# Patient Record
Sex: FEMALE | Race: BLACK OR AFRICAN AMERICAN | NOT HISPANIC OR LATINO
[De-identification: names, ages, dates, MRNs, and addresses within clinical notes are randomized per-mention and may not be internally consistent; named-entity substitution may affect disease eponyms.]

---

## 2019-09-09 ENCOUNTER — TRANSCRIPTION ENCOUNTER (OUTPATIENT)
Age: 59
End: 2019-09-09

## 2020-12-16 ENCOUNTER — APPOINTMENT (OUTPATIENT)
Dept: PULMONOLOGY | Facility: CLINIC | Age: 60
End: 2020-12-16
Payer: COMMERCIAL

## 2020-12-16 VITALS
HEART RATE: 100 BPM | SYSTOLIC BLOOD PRESSURE: 130 MMHG | OXYGEN SATURATION: 98 % | HEIGHT: 62 IN | TEMPERATURE: 97.2 F | WEIGHT: 164 LBS | BODY MASS INDEX: 30.18 KG/M2 | DIASTOLIC BLOOD PRESSURE: 80 MMHG

## 2020-12-16 DIAGNOSIS — Z12.2 ENCOUNTER FOR SCREENING FOR MALIGNANT NEOPLASM OF RESPIRATORY ORGANS: ICD-10-CM

## 2020-12-16 PROCEDURE — 95012 NITRIC OXIDE EXP GAS DETER: CPT | Mod: GC

## 2020-12-16 PROCEDURE — 94727 GAS DIL/WSHOT DETER LNG VOL: CPT | Mod: GC

## 2020-12-16 PROCEDURE — ZZZZZ: CPT

## 2020-12-16 PROCEDURE — 94060 EVALUATION OF WHEEZING: CPT | Mod: GC

## 2020-12-16 PROCEDURE — 94729 DIFFUSING CAPACITY: CPT | Mod: GC

## 2020-12-16 PROCEDURE — 99204 OFFICE O/P NEW MOD 45 MIN: CPT | Mod: 25,GC

## 2020-12-16 PROCEDURE — 99072 ADDL SUPL MATRL&STAF TM PHE: CPT

## 2020-12-16 NOTE — PHYSICAL EXAM
[No Acute Distress] : no acute distress [Normal Oropharynx] : normal oropharynx [II] : Mallampati Class: II [Normal Appearance] : normal appearance [No Neck Mass] : no neck mass [Normal Rate/Rhythm] : normal rate/rhythm [Normal S1, S2] : normal s1, s2 [No Murmurs] : no murmurs [No Resp Distress] : no resp distress [Wheeze] : wheeze [No Abnormalities] : no abnormalities [Benign] : benign [Normal Gait] : normal gait [No Clubbing] : no clubbing [No Cyanosis] : no cyanosis [No Edema] : no edema [FROM] : FROM [Normal Color/ Pigmentation] : normal color/ pigmentation [No Focal Deficits] : no focal deficits [Oriented x3] : oriented x3 [Normal Affect] : normal affect

## 2020-12-17 NOTE — ASSESSMENT
[FreeTextEntry1] : Data reviewed:\par \par PA/lat CXR in office 12/16/2020 :\par \par Yony 12/16/2020 : mild obstruction / FENO 5\par PFT 12/16/20: mild fixed obstruction (had albuterol before test), TLC 94%, DLCO 67%\par \par Impression:\par Hx of Asthma \par Smoker\par \par Plan:\par hx of childhood  onset asthma but also has > 40 pck yr hx of smoking. Symptoms not well controlled on current regimen of ICS. PFT shoes moderate obstruction with no significant bronchodilation. No other hx suggestive of allergic asthma,  Eosinophil normal ( patient portal 11/2020). \par - Probably ACOS, would escalate to ICS and LABA ( symbicort). \par - Albuterol as needed. \par - counseled for smoking cessation. \par - Ref to Lung cancer screening. \par - flu shot received at work. \par \par --\par Attending Addendum\par \par Seen and examined by me and agree w above. Active smoker w hx asthma, COPD on PFT, w daily symptoms despite Qvar. Step up to Symbicort, quit smoking w patch (has used successfully before), enter LDCT.\par

## 2020-12-17 NOTE — CONSULT LETTER
[Dear  ___] : Dear  [unfilled], [( Thank you for referring [unfilled] for consultation for _____ )] : Thank you for referring [unfilled] for consultation for [unfilled] [Please see my note below.] : Please see my note below. [Consult Closing:] : Thank you very much for allowing me to participate in the care of this patient.  If you have any questions, please do not hesitate to contact me. [Sincerely,] : Sincerely, [FreeTextEntry2] : Adiel Manley, DO\par [address] \[place] [address]\par  [FreeTextEntry3] : Madhuri Mcclelland MD, FCCP\par

## 2020-12-17 NOTE — REVIEW OF SYSTEMS
[Cough] : cough [Wheezing] : wheezing [Negative] : HEENT [Frequent URIs] : no frequent URIs [Sputum] : no sputum [Dyspnea] : no dyspnea [SOB on Exertion] : no sob on exertion [Chest Discomfort] : no chest discomfort [Edema] : no edema [Orthopnea] : no orthopnea [Palpitations] : no palpitations [Hay Fever] : no hay fever [Nasal Discharge] : no nasal discharge [GERD] : no gerd [Vomiting] : no vomiting [Diarrhea] : no diarrhea [Nocturia] : no nocturia [Arthralgias] : no arthralgias [Raynaud] : no raynaud [Anemia] : no anemia [Headache] : no headache [Focal Weakness] : no focal weakness [Seizures] : no seizures [Dizziness] : no dizziness [Depression] : no depression [Anxiety] : no anxiety [Diabetes] : no diabetes

## 2020-12-17 NOTE — HISTORY OF PRESENT ILLNESS
[Current] : current [>= 30 pack years] : >= 30 pack years [Never] : never [TextBox_4] : 12/16/2020 [Morgan]: Asked to evaluate patient by Dr Manley for asthma, \par She was dx with asthma in 1983, Admitted  2 times since then, Never intubated, has been on Symbicort but was that was stopped and started on Qvar ( ICS) 2 months ago. She is also on Singulair , uses albuterol 2 /day, and portable nebulizer 1-2 times a day. Symptomatic with SOB , wheezing and dry cough when having a bad day. Urgent care / ER visits for asthma: 3-4 times /yr. No allergies. Smoker > 40 pck yrs, she has a dog and a cat but does not think that she is allergic to them. 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 \par \par \par \par \par \par \par

## 2020-12-21 VITALS — WEIGHT: 164 LBS | HEIGHT: 62 IN | BODY MASS INDEX: 30.18 KG/M2

## 2020-12-21 PROBLEM — Z80.1 FAMILY HISTORY OF LUNG CANCER: Status: ACTIVE | Noted: 2020-12-21

## 2020-12-21 PROBLEM — F17.210 CIGARETTE SMOKER: Status: ACTIVE | Noted: 2020-12-18

## 2020-12-21 NOTE — REASON FOR VISIT
[Home] : at home, [unfilled] , at the time of the visit. [Medical Office: ([facility])___] : at the medical office located in  [Verbal consent obtained from patient] : the patient, [unfilled] [Annual Follow-Up] : an annual follow-up visit [Review of Eligibility] : review of eligibility [Low-Dose CT Screening Discussion] : low-dose CT lung cancer screening discussion

## 2020-12-21 NOTE — ASSESSMENT
[Discussed Risks and Advised to Quit Smoking] : Discussed risks and advised to quit smoking [Discussed Cessation Medication] : cessation medication was discussed [Discussed Cessation Strategies] : cessation strategies were discussed [Ready] : Patient is ready for cessation intervention [Contemplation] : Contemplation: The patient is considering quitting smoking

## 2020-12-22 ENCOUNTER — APPOINTMENT (OUTPATIENT)
Dept: PULMONOLOGY | Facility: CLINIC | Age: 60
End: 2020-12-22
Payer: COMMERCIAL

## 2020-12-22 DIAGNOSIS — Z80.1 FAMILY HISTORY OF MALIGNANT NEOPLASM OF TRACHEA, BRONCHUS AND LUNG: ICD-10-CM

## 2020-12-22 DIAGNOSIS — F17.210 NICOTINE DEPENDENCE, CIGARETTES, UNCOMPLICATED: ICD-10-CM

## 2020-12-22 PROCEDURE — G0296 VISIT TO DETERM LDCT ELIG: CPT

## 2020-12-22 PROCEDURE — 99072 ADDL SUPL MATRL&STAF TM PHE: CPT

## 2020-12-22 NOTE — PLAN
[Smoking Cessation Guidance Provided] : Smoking cessation guidance was provided to patient [[facility]] : referred to [facility] [phone] [Smoking Cessation] : smoking cessation [FreeTextEntry1] : Plan:\par -Low Dose CT chest for lung cancer screening\par -Follow up with patient and her referring provider after her LDCT results have been reviewed by the multi-disciplinary clinical team\par -Encouraged smoking cessation\par -Referral to CTC\par \par \par Engaged in shared decision making with Ms. OTONIEL FANG . Answered all questions. She verbalized understanding and agreement. She knows to call back with any questions or concerns

## 2020-12-22 NOTE — HISTORY OF PRESENT ILLNESS
[Current] : current smoker [_____ pack-years] : [unfilled] pack-years [TextBox_13] : Referred by Dr. Mcclelland\par \par Ms. OTONIEL FANG  is a 60 year old woman with a history of asthma, possible ACOS\par \par She  was seen in the office by Dr. Mcclelland for review of eligibility for, as well as, discussion of Low-Dose CT lung cancer screening program. Over the telephone today we reviewed and confirmed that the patient meets screening eligibility criteria:\par -Age: 60 year \par Smoking status:\par -Current smoker\par -Number of pack(s) per day: 3/4\par -Number of year smoked: 42\par -Number of pack years smokin\par \par Quit early this year but stress of pandemic made her relapse.\par \par \par Ms. FANG denies any signs or symptoms of lung cancer including new cough, change in cough, hemoptysis and unintentional weight loss. \par \par Ms. FANG denies any personal history of lung cancer. No lung cancer in a 1st degree relative. An aunt had lung cancer.  History of lung disease: asthma. 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

## 2021-01-05 ENCOUNTER — RESULT REVIEW (OUTPATIENT)
Age: 61
End: 2021-01-05

## 2021-01-05 ENCOUNTER — APPOINTMENT (OUTPATIENT)
Dept: CT IMAGING | Facility: HOSPITAL | Age: 61
End: 2021-01-05
Payer: COMMERCIAL

## 2021-01-05 ENCOUNTER — OUTPATIENT (OUTPATIENT)
Dept: OUTPATIENT SERVICES | Facility: HOSPITAL | Age: 61
LOS: 1 days | End: 2021-01-05
Payer: COMMERCIAL

## 2021-01-05 PROCEDURE — 71250 CT THORAX DX C-: CPT

## 2021-01-05 PROCEDURE — 71250 CT THORAX DX C-: CPT | Mod: 26

## 2021-01-13 ENCOUNTER — APPOINTMENT (OUTPATIENT)
Dept: PULMONOLOGY | Facility: CLINIC | Age: 61
End: 2021-01-13
Payer: COMMERCIAL

## 2021-01-13 VITALS
BODY MASS INDEX: 31.28 KG/M2 | TEMPERATURE: 98.1 F | SYSTOLIC BLOOD PRESSURE: 158 MMHG | WEIGHT: 170 LBS | HEIGHT: 62 IN | HEART RATE: 105 BPM | DIASTOLIC BLOOD PRESSURE: 93 MMHG | OXYGEN SATURATION: 98 % | RESPIRATION RATE: 12 BRPM

## 2021-01-13 DIAGNOSIS — F32.9 MAJOR DEPRESSIVE DISORDER, SINGLE EPISODE, UNSPECIFIED: ICD-10-CM

## 2021-01-13 DIAGNOSIS — J45.40 MODERATE PERSISTENT ASTHMA, UNCOMPLICATED: ICD-10-CM

## 2021-01-13 DIAGNOSIS — F17.200 NICOTINE DEPENDENCE, UNSPECIFIED, UNCOMPLICATED: ICD-10-CM

## 2021-01-13 DIAGNOSIS — I10 ESSENTIAL (PRIMARY) HYPERTENSION: ICD-10-CM

## 2021-01-13 DIAGNOSIS — F51.04 PSYCHOPHYSIOLOGIC INSOMNIA: ICD-10-CM

## 2021-01-13 PROCEDURE — 99072 ADDL SUPL MATRL&STAF TM PHE: CPT

## 2021-01-13 PROCEDURE — 99204 OFFICE O/P NEW MOD 45 MIN: CPT

## 2021-01-13 NOTE — HISTORY OF PRESENT ILLNESS
[FreeTextEntry1] : 01/13/2021 :  OTONIEL FANG is a 60 year current smoker( 1/2PPD x 30 years, part of our lung CA screening) female with PMHx depression, well controlled asthma, HTN, who is here for acute insomnia. \par \par She has been experiencing insomnia for the past 5 months. She was taking prozac and Ambien but  her psychiatrist does not accept her insurance. She was feeling well with Ambien and was sleeping about 5 hours/night which is what she feels she needs in order to feel rested. \par She feels sleepy during the day. She sometimes dozes off at work specially during virtual meetings.\par   \par \par Sleep Routine:\par \par She goes to bed at 11, sleep latency is about 1 hour,she wakes up twice, WASO is about 30-40 min, and then she is up at 5A. She naps about 1 hour almost everyday . Boykins sleepiness scale (out of 24 points) = 7\par \par  Her PFT in Dec 2020  showed: Mod obstruction, no post bronchodilator changes. lung volume wnl. mild decrease diffusion capacity.\par \par \par She denies snoring, witness apnea, cataplexy, RLS, parasomnia, or any other sleep behavioral issues\par

## 2021-01-13 NOTE — REVIEW OF SYSTEMS
[EDS: ESS=____] : daytime somnolence: ESS=[unfilled] [Fatigue] : fatigue [Obesity] : obesity [Nocturia] : nocturia [Depression] : depression [Negative] : Musculoskeletal [FreeTextEntry9] : HTN

## 2021-01-13 NOTE — ASSESSMENT
[FreeTextEntry1] : 59 y/o F with acute insomnia who is here for initial visit.  She seems to me to be sleepier than is normal for simple psychophysiologic insomnia.  Treated for depression in past, but does not feel (or seem to me) depressed now.  Will r/o obstructive sleep apnea with unattended home sleep testing before going further.

## 2021-01-13 NOTE — PHYSICAL EXAM
[Normal Appearance] : normal appearance [General Appearance - In No Acute Distress] : no acute distress [Normal Conjunctiva] : the conjunctiva exhibited no abnormalities [Normal Oropharynx] : normal oropharynx [III] : III [Neck Appearance] : the appearance of the neck was normal [Neck Cervical Mass (___cm)] : no neck mass was observed [Jugular Venous Distention Increased] : there was no jugular-venous distention [Thyroid Diffuse Enlargement] : the thyroid was not enlarged [Thyroid Nodule] : there were no palpable thyroid nodules [Apical Impulse] : the apical impulse was normal [Heart Rate And Rhythm] : heart rate was normal and rhythm regular [Heart Sounds] : normal S1 and S2 [Heart Sounds Gallop] : no gallops [Murmurs] : no murmurs [Heart Sounds Pericardial Friction Rub] : no pericardial rub [] : no respiratory distress [Respiration, Rhythm And Depth] : normal respiratory rhythm and effort [Exaggerated Use Of Accessory Muscles For Inspiration] : no accessory muscle use [Auscultation Breath Sounds / Voice Sounds] : lungs were clear to auscultation bilaterally [Chest Palpation] : palpation of the chest revealed no abnormalities [Lungs Percussion] : the lungs were normal to percussion [Abnormal Walk] : normal gait [Involuntary Movements] : no involuntary movements were seen [Nail Clubbing] : no clubbing of the fingernails [Skin Color & Pigmentation] : normal skin color and pigmentation [Skin Turgor] : normal skin turgor [No Focal Deficits] : no focal deficits [Oriented To Time, Place, And Person] : oriented to person, place, and time [Affect] : the affect was normal [Mood] : the mood was normal

## 2021-02-05 ENCOUNTER — APPOINTMENT (OUTPATIENT)
Dept: SLEEP CENTER | Facility: HOME HEALTH | Age: 61
End: 2021-02-05
Payer: COMMERCIAL

## 2021-02-05 ENCOUNTER — OUTPATIENT (OUTPATIENT)
Dept: OUTPATIENT SERVICES | Facility: HOSPITAL | Age: 61
LOS: 1 days | End: 2021-02-05
Payer: COMMERCIAL

## 2021-02-05 PROCEDURE — 95800 SLP STDY UNATTENDED: CPT | Mod: 26

## 2021-02-05 PROCEDURE — 95800 SLP STDY UNATTENDED: CPT

## 2021-02-08 DIAGNOSIS — G47.33 OBSTRUCTIVE SLEEP APNEA (ADULT) (PEDIATRIC): ICD-10-CM

## 2021-02-09 ENCOUNTER — TRANSCRIPTION ENCOUNTER (OUTPATIENT)
Age: 61
End: 2021-02-09

## 2021-02-11 ENCOUNTER — APPOINTMENT (OUTPATIENT)
Dept: PULMONOLOGY | Facility: CLINIC | Age: 61
End: 2021-02-11
Payer: COMMERCIAL

## 2021-02-11 VITALS
WEIGHT: 167 LBS | SYSTOLIC BLOOD PRESSURE: 142 MMHG | BODY MASS INDEX: 30.73 KG/M2 | TEMPERATURE: 97.3 F | DIASTOLIC BLOOD PRESSURE: 84 MMHG | OXYGEN SATURATION: 97 % | HEART RATE: 86 BPM | HEIGHT: 62 IN

## 2021-02-11 DIAGNOSIS — G47.00 INSOMNIA, UNSPECIFIED: ICD-10-CM

## 2021-02-11 PROCEDURE — 99072 ADDL SUPL MATRL&STAF TM PHE: CPT

## 2021-02-11 PROCEDURE — 99213 OFFICE O/P EST LOW 20 MIN: CPT

## 2021-02-11 NOTE — PHYSICAL EXAM
[Well Groomed] : well groomed [General Appearance - In No Acute Distress] : no acute distress [Normal Conjunctiva] : the conjunctiva exhibited no abnormalities [Normal Oropharynx] : normal oropharynx [Neck Appearance] : the appearance of the neck was normal [Neck Cervical Mass (___cm)] : no neck mass was observed [Jugular Venous Distention Increased] : there was no jugular-venous distention [Thyroid Diffuse Enlargement] : the thyroid was not enlarged [Thyroid Nodule] : there were no palpable thyroid nodules [Heart Rate And Rhythm] : heart rate was normal and rhythm regular [Heart Sounds] : normal S1 and S2 [Heart Sounds Gallop] : no gallops [Murmurs] : no murmurs [Heart Sounds Pericardial Friction Rub] : no pericardial rub [] : no respiratory distress [Auscultation Breath Sounds / Voice Sounds] : lungs were clear to auscultation bilaterally [Involuntary Movements] : no involuntary movements were seen [Abnormal Walk] : normal gait [Nail Clubbing] : no clubbing of the fingernails [Cyanosis, Localized] : no localized cyanosis [No Focal Deficits] : no focal deficits [Skin Color & Pigmentation] : normal skin color and pigmentation [Oriented To Time, Place, And Person] : oriented to person, place, and time [Impaired Insight] : insight and judgment were intact [Affect] : the affect was normal [Mood] : the mood was normal

## 2021-02-11 NOTE — PHYSICAL EXAM
[Well Groomed] : well groomed [General Appearance - In No Acute Distress] : no acute distress [Normal Conjunctiva] : the conjunctiva exhibited no abnormalities [Normal Oropharynx] : normal oropharynx [Neck Appearance] : the appearance of the neck was normal [Neck Cervical Mass (___cm)] : no neck mass was observed [Thyroid Diffuse Enlargement] : the thyroid was not enlarged [Jugular Venous Distention Increased] : there was no jugular-venous distention [Thyroid Nodule] : there were no palpable thyroid nodules [Heart Rate And Rhythm] : heart rate was normal and rhythm regular [Heart Sounds] : normal S1 and S2 [Heart Sounds Gallop] : no gallops [Murmurs] : no murmurs [Heart Sounds Pericardial Friction Rub] : no pericardial rub [] : no respiratory distress [Auscultation Breath Sounds / Voice Sounds] : lungs were clear to auscultation bilaterally [Abnormal Walk] : normal gait [Involuntary Movements] : no involuntary movements were seen [Nail Clubbing] : no clubbing of the fingernails [Cyanosis, Localized] : no localized cyanosis [No Focal Deficits] : no focal deficits [Skin Color & Pigmentation] : normal skin color and pigmentation [Oriented To Time, Place, And Person] : oriented to person, place, and time [Impaired Insight] : insight and judgment were intact [Affect] : the affect was normal [Mood] : the mood was normal

## 2021-02-18 ENCOUNTER — APPOINTMENT (OUTPATIENT)
Dept: PULMONOLOGY | Facility: CLINIC | Age: 61
End: 2021-02-18

## 2021-02-22 ENCOUNTER — APPOINTMENT (OUTPATIENT)
Dept: PULMONOLOGY | Facility: CLINIC | Age: 61
End: 2021-02-22
Payer: COMMERCIAL

## 2021-02-22 PROCEDURE — 99214 OFFICE O/P EST MOD 30 MIN: CPT | Mod: 95

## 2021-02-22 NOTE — CONSULT LETTER
[Dear  ___] : Dear  [unfilled], [Courtesy Letter:] : I had the pleasure of seeing your patient, [unfilled], in my office today. [Please see my note below.] : Please see my note below. [Consult Closing:] : Thank you very much for allowing me to participate in the care of this patient.  If you have any questions, please do not hesitate to contact me. [Sincerely,] : Sincerely, [FreeTextEntry2] : Adiel Manley, DO\par [address] \[place] [address]\par  [FreeTextEntry3] : Madhuri Mcclelland MD, FCCP\par

## 2021-02-22 NOTE — HISTORY OF PRESENT ILLNESS
[TextBox_4] : 12/16/2020: Seen in clinic w Dr Mora. Active smoker w hx asthma, COPD on PFT, w daily symptoms despite Qvar. Step up to Symbicort, quit smoking w patch (has used successfully before), enter LDCT.\par \par 2/22/21: Telehealth at her request due to Covid pandemic, both in [place]. She wants to follow up on her CT chest, which showed no suspicious nodules but emphysema and coronary calcification. She can only walk 1/2 block without dyspnea. She is having a cardiac cath Thursday. She did feel some improvement in her dyspnea on Symbicort vs Qvar. She had her Covid vaccines at work. Today is her first day attempting smoking cessation. She brought her breakfast and lunch to work so she would not have to go out, which is when she smokes. And she plans to not have beer tomorrow for her birthday because it would make her want to smoke. She is using bupropion and the patch.\par  \par

## 2021-02-22 NOTE — HISTORY OF PRESENT ILLNESS
[FreeTextEntry1] : 02/11/2021 :  OTONIEL FANG is a 60 year female current smoker with PMHx depression, asthma, HTN, and insomnia who is here for follow up after her HST.\par \par Her study shows: mild sleep apnea at 5.2, min below 89% saturation. \par Her psychiatric described Ambien about 2 years ago which stopped taking about 1 year ago. She has been seeinh someone through [facility] but is trying to find another psychiatrist. Otherwise, she denies any new complains or diagnosis since the last visit\par \par

## 2021-02-22 NOTE — ASSESSMENT
[FreeTextEntry1] : Data reviewed:\par \par LDCT [facility] 1/2021 personally reviewed :\par 1. Scattered solid micronodules.\par 2. Scattered linear parenchymal bands of scarring and/or atelectasis.\par 3. Moderate centrilobular emphysema.\par 4. Severe proximal left main coronary artery calcification/stent.\par \par Yony 12/16/2020 : mild obstruction / FENO 5\par PFT 12/16/20: mild fixed obstruction (had albuterol before test), TLC 94%, DLCO 67%\par \par Impression:\par Asthma/COPD overlap\par Tobacco dependence\par Evidence CAD\par \par Plan:\par Continue Symbicort. COPD is mild and would not add LAMA at this time.\par Stay in LDCT, next scan Jan 2022.\par Congratulations on her good work today in smoking cessation and her planning for triggers. Encouraged.\par Agree w cath as planned.

## 2021-02-22 NOTE — HISTORY OF PRESENT ILLNESS
[FreeTextEntry1] : 02/11/2021 :  OTONIEL FANG is a 60 year female current smoker with PMHx depression, asthma, HTN, and insomnia who is here for follow up after her HST.\par \par Her study shows: mild sleep apnea at 5.2, min below 89% saturation. \par Her psychiatric described Ambien about 2 years ago which stopped taking about 1 year ago. She has been seeinh someone through [facility] but is trying to find another psychiatrist. Otherwise, she denies any new complains or diagnosis since the last visit\par \par

## 2021-02-23 VITALS
DIASTOLIC BLOOD PRESSURE: 68 MMHG | RESPIRATION RATE: 16 BRPM | SYSTOLIC BLOOD PRESSURE: 155 MMHG | HEART RATE: 87 BPM | TEMPERATURE: 97 F | OXYGEN SATURATION: 98 %

## 2021-02-23 RX ORDER — CHLORHEXIDINE GLUCONATE 213 G/1000ML
1 SOLUTION TOPICAL ONCE
Refills: 0 | Status: DISCONTINUED | OUTPATIENT
Start: 2021-02-25 | End: 2021-02-25

## 2021-02-23 NOTE — H&P ADULT - ASSESSMENT
62 yo female, recent former smoker (quit one day ago), FHX CAD in mother, PMHx HTN, COPD, recently discovered CAD with LAD via CT scan presented today to [facility] for recommended cardiac cath with possible intervention in light of pt's risk factors, CCS 3 anginal symptoms and LAD disease found incidentally on CT scan.    ASA III and Mallampati III    OF NOTE:     Pt took daily dose of ASA 81 mg PO x1, and was additionally loaded with  mg PO x1 and Plavix 600 mg PO x1 prior to procedure.     Risks & benefits of procedure and alternative therapy have been explained to the patient including but not limited to: allergic reaction, bleeding w/possible need for blood transfusion, infection, renal and vascular compromise, limb damage, arrhythmia, stroke, vessel dissection/perforation, Myocardial infarction, emergent CABG. Informed consent obtained and in chart.   62 yo female, recent former smoker (quit one day ago), FHX CAD in mother, PMHx HTN, COPD, recently discovered CAD with LAD via CT scan presented today to [facility] for recommended cardiac cath with possible intervention in light of pt's risk factors, CCS 3 anginal symptoms and LAD disease found incidentally on CT scan.    ASA III and Mallampati III    OF NOTE:     Pt took daily dose of ASA 81 mg PO x1, and was additionally loaded with  mg PO x1 and Plavix 600 mg PO x1 prior to procedure.     For K of 3.0, Kdur 40 mEq was given as per Dr. Vasquez.     Risks & benefits of procedure and alternative therapy have been explained to the patient including but not limited to: allergic reaction, bleeding w/possible need for blood transfusion, infection, renal and vascular compromise, limb damage, arrhythmia, stroke, vessel dissection/perforation, Myocardial infarction, emergent CABG. Informed consent obtained and in chart.

## 2021-02-23 NOTE — H&P ADULT - HISTORY OF PRESENT ILLNESS
SKELETON    60 yo female, former smoker, FHX CAD in mother, PMHx HTN, COPD, recently discovered CAD with LAD presents to cardiologist, Dr Omer, endorsing shortness of breath with exertion of 1/2 block worsening over the last month. Pt saw pulmonologist Dr Mcclelland and pt underwent low dose CT scan of the chest of 01/05/21 showing no cancer but incidentally showing LAD disease.     In light of pt's risk factors and CCS Class .... anginal symptoms, and LAD disease found on CT scan pt presents for recommended cardiac catheterization on procedure.        Cardiologist: Dr Ramirez ([facility])  COVID PCR: [facility] [address] on 02/23/21, pt needs to call with our fax # so they can fax the information over to us.  Escort: sisterFlory  Pharmacy: [facility] [address]    Verify medications on arrival    60 yo female, recent former smoker (quit one day ago), FHX CAD in mother, PMHx HTN, COPD, recently discovered CAD with LAD via CT scan presents to cardiologist, Dr Ramirez, endorsing shortness of breath with exertion, mild chest pain, fatigue of 1/2 block worsening over the last month. Denies dizziness, diaphoresis, LE edema, orthopnea, palpitations, PND, N/V, syncope. Pt saw pulmonologist Dr Mcclelland and pt underwent low dose CT scan of the chest of 01/05/21 showing no cancer but incidentally showing LAD disease. Pt did not have dedicated cardiac testing.     In light of pt's risk factors and CCS Class III anginal symptoms, and LAD disease found on CT scan pt presents for recommended cardiac catheterization on procedure.        Cardiologist: Dr Ramirez ([facility])  COVID PCR: [facility] [address] on 02/23/21, pt needs to call with our fax # so they can fax the information over to us.  Escort: sister, Flory Watts  Pharmacy: [facility] [address]    62 yo female, recent former smoker (quit one day ago), FHX CAD in mother, PMHx HTN, COPD, recently discovered CAD with LAD via CT scan presents to cardiologist, Dr Ramirez, endorsing shortness of breath with exertion, mild chest pain, fatigue of 1/2 block worsening over the last month. Denies dizziness, diaphoresis, LE edema, orthopnea, palpitations, PND, N/V, syncope. Pt saw pulmonologist Dr Mcclelland and pt underwent low dose CT scan of the chest of 01/05/21 showing no cancer but incidentally showing LAD disease. Pt did not have dedicated cardiac testing.     In light of pt's risk factors and CCS Class III anginal symptoms, and LAD disease found on CT scan pt presents for recommended cardiac catheterization on procedure.

## 2021-02-25 ENCOUNTER — OUTPATIENT (OUTPATIENT)
Dept: OUTPATIENT SERVICES | Facility: HOSPITAL | Age: 61
LOS: 1 days | Discharge: NURSING, REHAB OR LONG-TERM CARE | End: 2021-02-25
Payer: COMMERCIAL

## 2021-02-25 LAB
ALBUMIN SERPL ELPH-MCNC: 4 G/DL — SIGNIFICANT CHANGE UP (ref 3.3–5)
ALP SERPL-CCNC: 132 U/L — HIGH (ref 40–120)
ALT FLD-CCNC: <5 U/L — LOW (ref 10–45)
ANION GAP SERPL CALC-SCNC: 9 MMOL/L — SIGNIFICANT CHANGE UP (ref 5–17)
APTT BLD: 32.4 SEC — SIGNIFICANT CHANGE UP (ref 27.5–35.5)
AST SERPL-CCNC: 13 U/L — SIGNIFICANT CHANGE UP (ref 10–40)
BASOPHILS # BLD AUTO: 0.03 K/UL — SIGNIFICANT CHANGE UP (ref 0–0.2)
BASOPHILS NFR BLD AUTO: 0.4 % — SIGNIFICANT CHANGE UP (ref 0–2)
BILIRUB SERPL-MCNC: 0.4 MG/DL — SIGNIFICANT CHANGE UP (ref 0.2–1.2)
BUN SERPL-MCNC: 10 MG/DL — SIGNIFICANT CHANGE UP (ref 7–23)
CALCIUM SERPL-MCNC: 9.3 MG/DL — SIGNIFICANT CHANGE UP (ref 8.4–10.5)
CHLORIDE SERPL-SCNC: 107 MMOL/L — SIGNIFICANT CHANGE UP (ref 96–108)
CHOLEST SERPL-MCNC: 100 MG/DL — SIGNIFICANT CHANGE UP
CK MB CFR SERPL CALC: 1.9 NG/ML — SIGNIFICANT CHANGE UP (ref 0–6.7)
CK SERPL-CCNC: 158 U/L — SIGNIFICANT CHANGE UP (ref 25–170)
CO2 SERPL-SCNC: 25 MMOL/L — SIGNIFICANT CHANGE UP (ref 22–31)
CREAT SERPL-MCNC: 0.76 MG/DL — SIGNIFICANT CHANGE UP (ref 0.5–1.3)
EOSINOPHIL # BLD AUTO: 0.09 K/UL — SIGNIFICANT CHANGE UP (ref 0–0.5)
EOSINOPHIL NFR BLD AUTO: 1.3 % — SIGNIFICANT CHANGE UP (ref 0–6)
GLUCOSE SERPL-MCNC: 116 MG/DL — HIGH (ref 70–99)
HCT VFR BLD CALC: 38.7 % — SIGNIFICANT CHANGE UP (ref 34.5–45)
HDLC SERPL-MCNC: 48 MG/DL — LOW
HGB BLD-MCNC: 12.7 G/DL — SIGNIFICANT CHANGE UP (ref 11.5–15.5)
IMM GRANULOCYTES NFR BLD AUTO: 0.4 % — SIGNIFICANT CHANGE UP (ref 0–1.5)
INR BLD: 1.01 — SIGNIFICANT CHANGE UP (ref 0.88–1.16)
LIPID PNL WITH DIRECT LDL SERPL: 26 MG/DL — SIGNIFICANT CHANGE UP
LYMPHOCYTES # BLD AUTO: 1.93 K/UL — SIGNIFICANT CHANGE UP (ref 1–3.3)
LYMPHOCYTES # BLD AUTO: 28 % — SIGNIFICANT CHANGE UP (ref 13–44)
MCHC RBC-ENTMCNC: 28.3 PG — SIGNIFICANT CHANGE UP (ref 27–34)
MCHC RBC-ENTMCNC: 32.8 GM/DL — SIGNIFICANT CHANGE UP (ref 32–36)
MCV RBC AUTO: 86.2 FL — SIGNIFICANT CHANGE UP (ref 80–100)
MONOCYTES # BLD AUTO: 0.67 K/UL — SIGNIFICANT CHANGE UP (ref 0–0.9)
MONOCYTES NFR BLD AUTO: 9.7 % — SIGNIFICANT CHANGE UP (ref 2–14)
NEUTROPHILS # BLD AUTO: 4.15 K/UL — SIGNIFICANT CHANGE UP (ref 1.8–7.4)
NEUTROPHILS NFR BLD AUTO: 60.2 % — SIGNIFICANT CHANGE UP (ref 43–77)
NON HDL CHOLESTEROL: 52 MG/DL — SIGNIFICANT CHANGE UP
NRBC # BLD: 0 /100 WBCS — SIGNIFICANT CHANGE UP (ref 0–0)
PLATELET # BLD AUTO: 249 K/UL — SIGNIFICANT CHANGE UP (ref 150–400)
POTASSIUM SERPL-MCNC: 3 MMOL/L — LOW (ref 3.5–5.3)
POTASSIUM SERPL-SCNC: 3 MMOL/L — LOW (ref 3.5–5.3)
PROT SERPL-MCNC: 6.9 G/DL — SIGNIFICANT CHANGE UP (ref 6–8.3)
PROTHROM AB SERPL-ACNC: 12.1 SEC — SIGNIFICANT CHANGE UP (ref 10.6–13.6)
RBC # BLD: 4.49 M/UL — SIGNIFICANT CHANGE UP (ref 3.8–5.2)
RBC # FLD: 13.4 % — SIGNIFICANT CHANGE UP (ref 10.3–14.5)
SODIUM SERPL-SCNC: 141 MMOL/L — SIGNIFICANT CHANGE UP (ref 135–145)
TRIGL SERPL-MCNC: 128 MG/DL — SIGNIFICANT CHANGE UP
WBC # BLD: 6.9 K/UL — SIGNIFICANT CHANGE UP (ref 3.8–10.5)
WBC # FLD AUTO: 6.9 K/UL — SIGNIFICANT CHANGE UP (ref 3.8–10.5)

## 2021-02-25 PROCEDURE — 93571 IV DOP VEL&/PRESS C FLO 1ST: CPT | Mod: 26,LC

## 2021-02-25 PROCEDURE — 82553 CREATINE MB FRACTION: CPT

## 2021-02-25 PROCEDURE — 85730 THROMBOPLASTIN TIME PARTIAL: CPT

## 2021-02-25 PROCEDURE — 93458 L HRT ARTERY/VENTRICLE ANGIO: CPT | Mod: 26

## 2021-02-25 PROCEDURE — C1894: CPT

## 2021-02-25 PROCEDURE — 80053 COMPREHEN METABOLIC PANEL: CPT

## 2021-02-25 PROCEDURE — 82550 ASSAY OF CK (CPK): CPT

## 2021-02-25 PROCEDURE — 93571 IV DOP VEL&/PRESS C FLO 1ST: CPT | Mod: LC

## 2021-02-25 PROCEDURE — 80061 LIPID PANEL: CPT

## 2021-02-25 PROCEDURE — C1887: CPT

## 2021-02-25 PROCEDURE — 85610 PROTHROMBIN TIME: CPT

## 2021-02-25 PROCEDURE — 36415 COLL VENOUS BLD VENIPUNCTURE: CPT

## 2021-02-25 PROCEDURE — C1769: CPT

## 2021-02-25 PROCEDURE — 85025 COMPLETE CBC W/AUTO DIFF WBC: CPT

## 2021-02-25 PROCEDURE — 93458 L HRT ARTERY/VENTRICLE ANGIO: CPT

## 2021-02-25 RX ORDER — POTASSIUM CHLORIDE 20 MEQ
20 PACKET (EA) ORAL ONCE
Refills: 0 | Status: COMPLETED | OUTPATIENT
Start: 2021-02-25 | End: 2021-02-25

## 2021-02-25 RX ORDER — VALSARTAN 80 MG/1
1 TABLET ORAL
Qty: 0 | Refills: 0 | DISCHARGE

## 2021-02-25 RX ORDER — ASPIRIN/CALCIUM CARB/MAGNESIUM 324 MG
0 TABLET ORAL
Qty: 0 | Refills: 0 | DISCHARGE

## 2021-02-25 RX ORDER — SODIUM CHLORIDE 9 MG/ML
500 INJECTION INTRAMUSCULAR; INTRAVENOUS; SUBCUTANEOUS
Refills: 0 | Status: DISCONTINUED | OUTPATIENT
Start: 2021-02-25 | End: 2021-02-25

## 2021-02-25 RX ORDER — CLOPIDOGREL BISULFATE 75 MG/1
600 TABLET, FILM COATED ORAL ONCE
Refills: 0 | Status: COMPLETED | OUTPATIENT
Start: 2021-02-25 | End: 2021-02-25

## 2021-02-25 RX ORDER — ATORVASTATIN CALCIUM 80 MG/1
1 TABLET, FILM COATED ORAL
Qty: 0 | Refills: 0 | DISCHARGE

## 2021-02-25 RX ORDER — LUBIPROSTONE 24 UG/1
1 CAPSULE, GELATIN COATED ORAL
Qty: 0 | Refills: 0 | DISCHARGE

## 2021-02-25 RX ORDER — BUDESONIDE AND FORMOTEROL FUMARATE DIHYDRATE 160; 4.5 UG/1; UG/1
2 AEROSOL RESPIRATORY (INHALATION)
Qty: 0 | Refills: 0 | DISCHARGE

## 2021-02-25 RX ORDER — ASPIRIN/CALCIUM CARB/MAGNESIUM 324 MG
243 TABLET ORAL ONCE
Refills: 0 | Status: COMPLETED | OUTPATIENT
Start: 2021-02-25 | End: 2021-02-25

## 2021-02-25 RX ORDER — POTASSIUM CHLORIDE 20 MEQ
40 PACKET (EA) ORAL ONCE
Refills: 0 | Status: COMPLETED | OUTPATIENT
Start: 2021-02-25 | End: 2021-02-25

## 2021-02-25 RX ORDER — MONTELUKAST 4 MG/1
1 TABLET, CHEWABLE ORAL
Qty: 0 | Refills: 0 | DISCHARGE

## 2021-02-25 RX ORDER — ALBUTEROL 90 UG/1
2 AEROSOL, METERED ORAL
Qty: 0 | Refills: 0 | DISCHARGE

## 2021-02-25 RX ORDER — SODIUM CHLORIDE 9 MG/ML
0 INJECTION INTRAMUSCULAR; INTRAVENOUS; SUBCUTANEOUS
Refills: 0 | Status: DISCONTINUED | OUTPATIENT
Start: 2021-02-25 | End: 2021-02-25

## 2021-02-25 RX ORDER — NICOTINE POLACRILEX 2 MG
1 GUM BUCCAL
Qty: 0 | Refills: 0 | DISCHARGE

## 2021-02-25 RX ORDER — AMLODIPINE BESYLATE 2.5 MG/1
1 TABLET ORAL
Qty: 0 | Refills: 0 | DISCHARGE

## 2021-02-25 RX ADMIN — SODIUM CHLORIDE 50 MILLILITER(S): 9 INJECTION INTRAMUSCULAR; INTRAVENOUS; SUBCUTANEOUS at 08:35

## 2021-02-25 RX ADMIN — Medication 243 MILLIGRAM(S): at 08:34

## 2021-02-25 RX ADMIN — Medication 20 MILLIEQUIVALENT(S): at 10:22

## 2021-02-25 RX ADMIN — CLOPIDOGREL BISULFATE 600 MILLIGRAM(S): 75 TABLET, FILM COATED ORAL at 08:34

## 2021-02-25 RX ADMIN — Medication 40 MILLIEQUIVALENT(S): at 08:43

## 2021-02-25 NOTE — PROGRESS NOTE ADULT - SUBJECTIVE AND OBJECTIVE BOX
Interventional Cardiology PA SDA Discharge Note    Patient without complaints. Ambulated and voided without difficulties    Afebrile, VSS    Ext:    Right  Radial :    hematoma,     bleeding, dressing; C/D/I      Pulses:    intact RAD to baseline     A/P:    62 yo female, recent former smoker (quit one day ago), FHX CAD in mother, PMHx HTN, COPD, recently discovered CAD with LAD via CT scan presents to cardiologist, Dr Ramirez, endorsing shortness of breath with exertion, mild chest pain, fatigue of 1/2 block worsening over the last month. Pt saw pulmonologist Dr Mcclelland and pt underwent low dose CT scan of the chest of 01/05/21 showing no cancer but incidentally showing LAD disease. In light of pt's risk factors and CCS Class III anginal symptoms, and LAD disease found on CT scan pt presents for recommended cardiac catheterization on procedure.     -H/H = 12.7/38.7. Pt denies BRBPR, hematuria, hematochezia, melena. Pt loaded 243mg ASA x1 and Plavix 600mg x1.  -Cr = 0.76. EF normal. Euvolemic on exam. IVF @ 50cc/hr x4 hours started pre procedure.    Cardiac cath 2/25/2021: Diagnostic only; 50-60% dLCx (IFR 0.95), 30-50% m/dRCA, 30% pLAD large vessel, 30-50% D1.    Right radial access with TR band placed and removed once hemostasis achieved. No hematoma, no bleed.  Total contrast used: 110cc. Pt remains euvolemic s/p procedure. IVF @75cc/hr x4 hours ordered post-procedure.      1.	Stable for discharge as per attending Dr. Guzman after bed rest, pt voids, groin/wrist stable and 30 minutes of ambulation.  2.	Follow-up with PMD/Cardiologist Dr. Ramirez in 1-2 weeks  3.	Discharged forms signed and copies in chart       Interventional Cardiology PA SDA Discharge Note    Patient without complaints. Ambulated and voided without difficulties    Afebrile, VSS    Ext:    Right  Radial :    hematoma,     bleeding, dressing; C/D/I      Pulses:    intact RAD to baseline     A/P:    62 yo female, recent former smoker (quit one day ago), FHX CAD in mother, PMHx HTN, COPD, recently discovered CAD with LAD via CT scan presents to cardiologist, Dr Ramirez, endorsing shortness of breath with exertion, mild chest pain, fatigue of 1/2 block worsening over the last month. Pt saw pulmonologist Dr Mcclelland and pt underwent low dose CT scan of the chest of 01/05/21 showing no cancer but incidentally showing LAD disease. In light of pt's risk factors and CCS Class III anginal symptoms, and LAD disease found on CT scan pt presents for recommended cardiac catheterization on procedure.     -H/H = 12.7/38.7. Pt denies BRBPR, hematuria, hematochezia, melena. Pt loaded 243mg ASA x1 and Plavix 600mg x1.  -Cr = 0.76. EF normal. Euvolemic on exam. IVF @ 50cc/hr x4 hours started pre procedure.    Cardiac cath 2/25/2021: Diagnostic only; 50-60% dLCx (IFR 0.95), 30-50% m/dRCA, 30% pLAD large vessel, 30-50% D1.    Right radial access with TR band placed and removed once hemostasis achieved. No hematoma, no bleed.  Total contrast used: 110cc. Pt remains euvolemic s/p procedure. IVF @75cc/hr x4 hours ordered post-procedure.    Of note: K = 3.0 the morning of procedure. PO KCL 40mEq given prior to procedure and additional PO KCL 20mEq given post-procedure for repletion.      1.	Stable for discharge as per attending Dr. Guzman after bed rest, pt voids, groin/wrist stable and 30 minutes of ambulation.  2.	Follow-up with PMD/Cardiologist Dr. Ramirez in 1-2 weeks  3.	Discharged forms signed and copies in chart

## 2022-01-24 NOTE — H&P ADULT - PATIENT ON (OXYGEN DELIVERY METHOD)
Ravindra Tijerina is calling to request a refill on the following medication(s):    Medication Request:  Requested Prescriptions     Pending Prescriptions Disp Refills    Insulin Glargine, 2 Unit Dial, (TOUJEO MAX SOLOSTAR) 300 UNIT/ML SOPN 9 mL 0     Sig: INJECT 30 UNITS SUBCUTANEOUSLY ONCE DAILY       Last Visit Date (If Applicable):  4/88/8968    Next Visit Date:    Visit date not found room air

## 2023-08-23 ENCOUNTER — APPOINTMENT (OUTPATIENT)
Dept: PULMONOLOGY | Facility: CLINIC | Age: 63
End: 2023-08-23
Payer: COMMERCIAL

## 2023-08-23 VITALS
DIASTOLIC BLOOD PRESSURE: 76 MMHG | WEIGHT: 164 LBS | TEMPERATURE: 98.2 F | OXYGEN SATURATION: 98 % | SYSTOLIC BLOOD PRESSURE: 125 MMHG | HEIGHT: 62 IN | BODY MASS INDEX: 30.18 KG/M2 | HEART RATE: 95 BPM

## 2023-08-23 DIAGNOSIS — J45.901 UNSPECIFIED ASTHMA WITH (ACUTE) EXACERBATION: ICD-10-CM

## 2023-08-23 PROCEDURE — 99203 OFFICE O/P NEW LOW 30 MIN: CPT

## 2023-08-23 PROCEDURE — 99213 OFFICE O/P EST LOW 20 MIN: CPT

## 2023-08-23 NOTE — PHYSICAL EXAM
[No Acute Distress] : no acute distress [Normal Oropharynx] : normal oropharynx [Normal Appearance] : normal appearance [No Neck Mass] : no neck mass [Normal Rate/Rhythm] : normal rate/rhythm [Normal S1, S2] : normal s1, s2 [No Resp Distress] : no resp distress [Clear to Auscultation Bilaterally] : clear to auscultation bilaterally [Normal Gait] : normal gait [Oriented x3] : oriented x3 [Normal Affect] : normal affect

## 2023-08-25 NOTE — ASSESSMENT
[FreeTextEntry1] : 62 y/o former smoker F who quit smoking a month ago and is here for evaluation of asthma exacerbation which now has improved after quit smoking.   Check pulmonary function testing, ? need for bronchodilator rx.  F/U with LDCT lung cancer screen

## 2023-08-25 NOTE — HISTORY OF PRESENT ILLNESS
[TextBox_4] : 08/23/2023 :  OTONIEL FANG is a 63 year old former smoker (quit July 10 2023, 15 cigarette/day since age of 18)  female with PMHx COPD/asthma on Montelukast 10 mg, albuterol last use was 4 days ago, and Singulair who is here for evaluation of asthma. Previously seen by Dr. Mcclelland > 2yrs ago, had CT showing emphysema, micronodules, and coronary calcification1/6/21.      she states that her respiratory symptoms got worse few weeks ago but once she stopped smoking her symptoms have improved. She has not used her Symbicort for few months. She is happy with her current regimen.  last PFT was in 2020: FEV1 71%, minimal BD response.   She denies any SOB at this time and can walk without any complaints of dyspnea or wheeze.  Prior unattended home sleep testing mild sleep disordered breathing, no rx

## 2023-11-15 ENCOUNTER — APPOINTMENT (OUTPATIENT)
Dept: PULMONOLOGY | Facility: CLINIC | Age: 63
End: 2023-11-15

## 2024-01-11 ENCOUNTER — NON-APPOINTMENT (OUTPATIENT)
Age: 64
End: 2024-01-11

## 2024-02-02 ENCOUNTER — APPOINTMENT (OUTPATIENT)
Dept: PULMONOLOGY | Facility: CLINIC | Age: 64
End: 2024-02-02
Payer: COMMERCIAL

## 2024-02-02 VITALS
DIASTOLIC BLOOD PRESSURE: 80 MMHG | WEIGHT: 166 LBS | OXYGEN SATURATION: 96 % | HEIGHT: 62 IN | BODY MASS INDEX: 30.55 KG/M2 | SYSTOLIC BLOOD PRESSURE: 120 MMHG | TEMPERATURE: 98.2 F | HEART RATE: 105 BPM

## 2024-02-02 PROCEDURE — 95012 NITRIC OXIDE EXP GAS DETER: CPT

## 2024-02-02 PROCEDURE — 99213 OFFICE O/P EST LOW 20 MIN: CPT | Mod: 25

## 2024-02-02 PROCEDURE — 94010 BREATHING CAPACITY TEST: CPT

## 2024-02-09 ENCOUNTER — APPOINTMENT (OUTPATIENT)
Dept: PULMONOLOGY | Facility: CLINIC | Age: 64
End: 2024-02-09
Payer: COMMERCIAL

## 2024-02-09 ENCOUNTER — NON-APPOINTMENT (OUTPATIENT)
Age: 64
End: 2024-02-09

## 2024-02-09 VITALS — HEIGHT: 62 IN | BODY MASS INDEX: 30.55 KG/M2 | WEIGHT: 166 LBS

## 2024-02-09 DIAGNOSIS — Z87.891 PERSONAL HISTORY OF NICOTINE DEPENDENCE: ICD-10-CM

## 2024-02-09 PROCEDURE — G0296 VISIT TO DETERM LDCT ELIG: CPT

## 2024-02-12 PROBLEM — Z87.891 FORMER CIGARETTE SMOKER: Status: ACTIVE | Noted: 2024-02-09

## 2024-02-12 NOTE — PLAN
[FreeTextEntry1] : Plan: -Low Dose CT chest for lung cancer screening -Follow up with patient and her referring provider after her LDCT results have been reviewed by the multi-disciplinary clinical team -Encouraged continued smoking abstinence   Engaged in shared decision making with Ms. OTONIEL FANG . Answered all questions. She verbalized understanding and agreement. She knows to call back with any questions or concerns

## 2024-02-12 NOTE — HISTORY OF PRESENT ILLNESS
[TextBox_13] : Referred by Dr. Madhuri Mcclelland  Ms. OTONIEL FANG  is a 63 year old woman with a history of COPD, asthma and nicotine dependence  She  was seen in the office by   for review of eligibility for, as well as, discussion of Low-Dose CT lung cancer screening program. Over the telephone today we reviewed and confirmed that the patient meets screening eligibility criteria: -Age: 63 year  Smoking status: -Former smoker -Number of pack(s) per day: 1 -Number of years smoked: 46 -Number of pack years smokin -Number of years since quitting smoking: 3 months (2023) -Quit year:   Ms. FANG denies any signs or symptoms of lung cancer including new cough, change in cough, hemoptysis and unintentional weight loss.   Ms. FANG denies any personal history of lung cancer. No lung cancer in a 1st degree relative. An aunt had lung cancer. History of lung disease: asthma and COPD. Denies any history of occupational exposures.   [YearQuit] : 2023 [PacksperYear] : 46

## 2024-02-16 ENCOUNTER — APPOINTMENT (OUTPATIENT)
Dept: CT IMAGING | Facility: HOSPITAL | Age: 64
End: 2024-02-16

## 2024-05-03 ENCOUNTER — APPOINTMENT (OUTPATIENT)
Dept: PULMONOLOGY | Facility: CLINIC | Age: 64
End: 2024-05-03
Payer: COMMERCIAL

## 2024-05-03 VITALS
HEIGHT: 62 IN | TEMPERATURE: 96.4 F | OXYGEN SATURATION: 95 % | DIASTOLIC BLOOD PRESSURE: 80 MMHG | HEART RATE: 94 BPM | WEIGHT: 167 LBS | SYSTOLIC BLOOD PRESSURE: 120 MMHG | BODY MASS INDEX: 30.73 KG/M2

## 2024-05-03 DIAGNOSIS — J44.89 OTHER SPECIFIED CHRONIC OBSTRUCTIVE PULMONARY DISEASE: ICD-10-CM

## 2024-05-03 DIAGNOSIS — R91.1 SOLITARY PULMONARY NODULE: ICD-10-CM

## 2024-05-03 PROCEDURE — 99214 OFFICE O/P EST MOD 30 MIN: CPT | Mod: 25

## 2024-05-03 PROCEDURE — 94010 BREATHING CAPACITY TEST: CPT

## 2024-05-03 RX ORDER — FLUTICASONE PROPIONATE AND SALMETEROL 250; 50 UG/1; UG/1
250-50 POWDER RESPIRATORY (INHALATION)
Qty: 3 | Refills: 3 | Status: ACTIVE | COMMUNITY
Start: 2024-02-02 | End: 1900-01-01

## 2024-05-03 RX ORDER — BUDESONIDE AND FORMOTEROL FUMARATE DIHYDRATE 160; 4.5 UG/1; UG/1
160-4.5 AEROSOL RESPIRATORY (INHALATION) TWICE DAILY
Qty: 3 | Refills: 3 | Status: DISCONTINUED | COMMUNITY
Start: 2020-12-16 | End: 2024-05-03

## 2024-05-03 RX ORDER — BUDESONIDE AND FORMOTEROL FUMARATE DIHYDRATE 160; 4.5 UG/1; UG/1
160-4.5 AEROSOL RESPIRATORY (INHALATION) TWICE DAILY
Qty: 1 | Refills: 0 | Status: DISCONTINUED | COMMUNITY
Start: 2020-12-16 | End: 2024-05-03

## 2024-05-03 RX ORDER — ALBUTEROL SULFATE 90 UG/1
108 (90 BASE) INHALANT RESPIRATORY (INHALATION)
Qty: 3 | Refills: 3 | Status: ACTIVE | COMMUNITY
Start: 2024-05-03 | End: 1900-01-01

## 2024-06-03 NOTE — CONSULT LETTER
[Dear  ___] : Dear ~BRAD, [Courtesy Letter:] : I had the pleasure of seeing your patient, [unfilled], in my office today. [Please see my note below.] : Please see my note below. [Consult Closing:] : Thank you very much for allowing me to participate in the care of this patient.  If you have any questions, please do not hesitate to contact me. [Sincerely,] : Sincerely, [FreeTextEntry2] : RAUL Najera [address] [place] [address]  [FreeTextEntry3] : Madhuri Mcclelland MD, FCCP\par

## 2024-06-03 NOTE — ASSESSMENT
[FreeTextEntry1] : Data reviewed:  LDCT [facility] 4/2024 personally reviewed : emphysema, tiny nodules including approx 6mm GGO RUL  [place] 12/16/2020 : mild obstruction / FENO 5 PFT 12/16/20: mild fixed obstruction (had albuterol before test), TLC 94%, DLCO 67% [place] 2/2/2024: mod obstruction, FEV1 63% / FENO 5 [place] 5/3/2024: mod obstruction, FEV1 68%  Impression: Asthma/COPD overlap w frequent exacerbations Tobacco dependence, quit 7/2023, in LDCT w nodule  Plan: Cont Romina, adjusted her technique a bit. Repeat LDCT 6 mos at [facility] and see me after that. -- Addendum 5/2024 needs nebulizer in the home for nebulized bronchodilators

## 2024-06-03 NOTE — HISTORY OF PRESENT ILLNESS
[Former] : former [TextBox_4] : 12/16/2020: Seen in clinic w Dr Mora. Active smoker w hx asthma since her 20s, COPD on PFT, w daily symptoms despite Qvar. Step up to Symbicort, quit smoking w patch (has used successfully before), enter LDCT.  2/22/21: Telehealth at her request due to Covid pandemic, both in [place]. She wants to follow up on her CT chest, which showed no suspicious nodules but emphysema and coronary calcification. She can only walk 1/2 block without dyspnea. She is having a cardiac cath Thursday. She did feel some improvement in her dyspnea on Symbicort vs Qvar. She had her Covid vaccines at work. Today is her first day attempting smoking cessation. She brought her breakfast and lunch to work so she would not have to go out, which is when she smokes. And she plans to not have beer tomorrow for her birthday because it would make her want to smoke. She is using bupropion and the patch.  2/2/2024: In the interim saw Dr Wylie for her asthma in 8/2023. On montelukast, and uses her albuterol about weekly and nebulizer about monthly. 2 mos ago however needed steroids and has needed steroids 10x in past year. 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 Follows w Dr Fletcher now for cardiology.  [YearQuit] : 2023

## 2024-06-03 NOTE — PHYSICAL EXAM
[No Acute Distress] : no acute distress [Normal Rate/Rhythm] : normal rate/rhythm [Normal S1, S2] : normal s1, s2 [No Murmurs] : no murmurs [No Resp Distress] : no resp distress [Clear to Auscultation Bilaterally] : clear to auscultation bilaterally [TextBox_105] : ++ clubbing

## 2024-06-03 NOTE — HISTORY OF PRESENT ILLNESS
[Former] : former [TextBox_4] : 12/16/2020: Seen in clinic w Dr Mora. Active smoker w hx asthma since her 20s, COPD on PFT, w daily symptoms despite Qvar. Step up to Symbicort, quit smoking w patch (has used successfully before), enter LDCT.  2/22/21: Telehealth at her request due to Covid pandemic, both in [place]. She wants to follow up on her CT chest, which showed no suspicious nodules but emphysema and coronary calcification. She can only walk 1/2 block without dyspnea. She is having a cardiac cath Thursday. She did feel some improvement in her dyspnea on Symbicort vs Qvar. She had her Covid vaccines at work. Today is her first day attempting smoking cessation. She brought her breakfast and lunch to work so she would not have to go out, which is when she smokes. And she plans to not have beer tomorrow for her birthday because it would make her want to smoke. She is using bupropion and the patch.  2/2/2024: In the interim saw Dr Wylie for her asthma in 8/2023. On montelukast, and uses her albuterol about weekly and nebulizer about monthly. 2 mos ago however needed steroids and has needed steroids 10x in past year. 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 Follows w Dr Fletcher now for cardiology.  5/3/2024: Comes in on generic f-s faithfully bid. Does not feel better. Was told LDCT would be $100 so did it at [facility] and it was free there. [YearQuit] : 2023

## 2024-06-03 NOTE — ASSESSMENT
[FreeTextEntry1] : Data reviewed:  LDCT [facility] 1/2021 personally reviewed : 1. Scattered solid micronodules. 2. Scattered linear parenchymal bands of scarring and/or atelectasis. 3. Moderate centrilobular emphysema. 4. Severe proximal left main coronary artery calcification/stent.  [place] 12/16/2020 : mild obstruction / FENO 5 PFT 12/16/20: mild fixed obstruction (had albuterol before test), TLC 94%, DLCO 67% [place] 2/2/2024: mod obstruction, FEV1 63% / FENO 5  Impression: Asthma/COPD overlap w frequent exacerbations Tobacco dependence, quit 7/2023  Plan: Needs daily controller. Didn't like Symbicort. Try Wixela or as covered. Re-enter LDCT. Follow up 3 mos.

## 2024-06-03 NOTE — PHYSICAL EXAM
[No Acute Distress] : no acute distress [Normal Rate/Rhythm] : normal rate/rhythm [Normal S1, S2] : normal s1, s2 [No Murmurs] : no murmurs [No Resp Distress] : no resp distress [Clear to Auscultation Bilaterally] : clear to auscultation bilaterally [TextBox_105] : clubbing

## 2024-10-28 ENCOUNTER — APPOINTMENT (OUTPATIENT)
Dept: PULMONOLOGY | Facility: CLINIC | Age: 64
End: 2024-10-28
Payer: COMMERCIAL

## 2024-10-28 ENCOUNTER — NON-APPOINTMENT (OUTPATIENT)
Age: 64
End: 2024-10-28

## 2024-10-28 VITALS
SYSTOLIC BLOOD PRESSURE: 114 MMHG | WEIGHT: 167 LBS | OXYGEN SATURATION: 96 % | HEIGHT: 62 IN | HEART RATE: 98 BPM | DIASTOLIC BLOOD PRESSURE: 74 MMHG | BODY MASS INDEX: 30.73 KG/M2 | TEMPERATURE: 97.9 F

## 2024-10-28 DIAGNOSIS — R91.1 SOLITARY PULMONARY NODULE: ICD-10-CM

## 2024-10-28 DIAGNOSIS — J45.40 MODERATE PERSISTENT ASTHMA, UNCOMPLICATED: ICD-10-CM

## 2024-10-28 DIAGNOSIS — J44.89 OTHER SPECIFIED CHRONIC OBSTRUCTIVE PULMONARY DISEASE: ICD-10-CM

## 2024-10-28 PROCEDURE — G2211 COMPLEX E/M VISIT ADD ON: CPT | Mod: NC

## 2024-10-28 PROCEDURE — 99214 OFFICE O/P EST MOD 30 MIN: CPT

## 2024-10-28 RX ORDER — IPRATROPIUM BROMIDE AND ALBUTEROL SULFATE 2.5; .5 MG/3ML; MG/3ML
0.5-2.5 (3) SOLUTION RESPIRATORY (INHALATION)
Qty: 120 | Refills: 1 | Status: ACTIVE | COMMUNITY
Start: 2024-10-28 | End: 1900-01-01

## 2025-01-10 ENCOUNTER — NON-APPOINTMENT (OUTPATIENT)
Age: 65
End: 2025-01-10
